# Patient Record
Sex: MALE | Race: WHITE | NOT HISPANIC OR LATINO
[De-identification: names, ages, dates, MRNs, and addresses within clinical notes are randomized per-mention and may not be internally consistent; named-entity substitution may affect disease eponyms.]

---

## 2018-08-31 ENCOUNTER — APPOINTMENT (OUTPATIENT)
Dept: PULMONOLOGY | Facility: CLINIC | Age: 28
End: 2018-08-31
Payer: COMMERCIAL

## 2018-08-31 VITALS
OXYGEN SATURATION: 100 % | HEIGHT: 74 IN | BODY MASS INDEX: 23.61 KG/M2 | SYSTOLIC BLOOD PRESSURE: 140 MMHG | HEART RATE: 79 BPM | WEIGHT: 184 LBS | DIASTOLIC BLOOD PRESSURE: 80 MMHG

## 2018-08-31 DIAGNOSIS — Z78.9 OTHER SPECIFIED HEALTH STATUS: ICD-10-CM

## 2018-08-31 DIAGNOSIS — Z57.9 OCCUPATIONAL EXPOSURE TO UNSPECIFIED RISK FACTOR: ICD-10-CM
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
## 2018-08-31 DIAGNOSIS — J38.00 PARALYSIS OF VOCAL CORDS AND LARYNX, UNSPECIFIED: ICD-10-CM

## 2018-08-31 DIAGNOSIS — R05 COUGH: ICD-10-CM

## 2018-08-31 DIAGNOSIS — Z72.820 SLEEP DEPRIVATION: ICD-10-CM

## 2018-08-31 PROCEDURE — 71046 X-RAY EXAM CHEST 2 VIEWS: CPT

## 2018-08-31 PROCEDURE — 94010 BREATHING CAPACITY TEST: CPT

## 2018-08-31 PROCEDURE — ZZZZZ: CPT

## 2018-08-31 PROCEDURE — 94727 GAS DIL/WSHOT DETER LNG VOL: CPT

## 2018-08-31 PROCEDURE — 99204 OFFICE O/P NEW MOD 45 MIN: CPT | Mod: 25

## 2018-08-31 PROCEDURE — 94729 DIFFUSING CAPACITY: CPT

## 2018-08-31 RX ORDER — RANITIDINE HYDROCHLORIDE 300 MG/1
300 CAPSULE ORAL
Qty: 90 | Refills: 1 | Status: ACTIVE | COMMUNITY
Start: 2018-08-31 | End: 1900-01-01

## 2018-08-31 SDOH — HEALTH STABILITY - PHYSICAL HEALTH: OCCUPATIONAL EXPOSURE TO UNSPECIFIED RISK FACTOR: Z57.9
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
## 2018-08-31 NOTE — PHYSICAL EXAM
[General Appearance - Well Developed] : well developed [Normal Appearance] : normal appearance [Well Groomed] : well groomed [General Appearance - Well Nourished] : well nourished [No Deformities] : no deformities [General Appearance - In No Acute Distress] : no acute distress [Normal Conjunctiva] : the conjunctiva exhibited no abnormalities [Eyelids - No Xanthelasma] : the eyelids demonstrated no xanthelasmas [Normal Oropharynx] : normal oropharynx [Neck Cervical Mass (___cm)] : no neck mass was observed [Jugular Venous Distention Increased] : there was no jugular-venous distention [Thyroid Diffuse Enlargement] : the thyroid was not enlarged [Thyroid Nodule] : there were no palpable thyroid nodules [Heart Rate And Rhythm] : heart rate and rhythm were normal [Heart Sounds] : normal S1 and S2 [Murmurs] : no murmurs present [Respiration, Rhythm And Depth] : normal respiratory rhythm and effort [Exaggerated Use Of Accessory Muscles For Inspiration] : no accessory muscle use [Auscultation Breath Sounds / Voice Sounds] : lungs were clear to auscultation bilaterally [Abdomen Soft] : soft [Abdomen Tenderness] : non-tender [Abdomen Mass (___ Cm)] : no abdominal mass palpated [Abnormal Walk] : normal gait [Gait - Sufficient For Exercise Testing] : the gait was sufficient for exercise testing [Nail Clubbing] : no clubbing of the fingernails [Cyanosis, Localized] : no localized cyanosis [Petechial Hemorrhages (___cm)] : no petechial hemorrhages [Skin Color & Pigmentation] : normal skin color and pigmentation [Skin Turgor] : normal skin turgor [] : no rash [Deep Tendon Reflexes (DTR)] : deep tendon reflexes were 2+ and symmetric [Sensation] : the sensory exam was normal to light touch and pinprick [No Focal Deficits] : no focal deficits [Oriented To Time, Place, And Person] : oriented to person, place, and time [Impaired Insight] : insight and judgment were intact [Affect] : the affect was normal [FreeTextEntry1] : right neck trauma (post surgical)
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
## 2018-08-31 NOTE — PROCEDURE
[FreeTextEntry1] : PFT- spi reveals normal flows; FEV1 was  4.96L which is 99% of predicted; normal lung volumes; normal diffusion at 46.4, which is 140% of predicted; normal flow volume loop

## 2018-10-26 ENCOUNTER — APPOINTMENT (OUTPATIENT)
Dept: PULMONOLOGY | Facility: CLINIC | Age: 28
End: 2018-10-26

## 2019-02-13 NOTE — ADDENDUM
[FreeTextEntry1] : Documented by Jeff Cox acting as a scribe for Dr. London Story on 8/31/18\par \par All medical record entries made by the Scribe were at my, Dr. London Story's, direction and personally dictated by me on 8/31/18. I have reviewed the chart and agree that the record accurately reflects my personal performance of the history, physical exam, assessment and plan. I have also personally directed, reviewed, and agree with the discharge instructions. \par \par \par \par \par   2

## 2019-08-28 ENCOUNTER — EMERGENCY (EMERGENCY)
Facility: HOSPITAL | Age: 29
LOS: 1 days | Discharge: HOME | End: 2019-08-28
Attending: EMERGENCY MEDICINE | Admitting: EMERGENCY MEDICINE
Payer: COMMERCIAL

## 2019-08-28 VITALS
SYSTOLIC BLOOD PRESSURE: 169 MMHG | TEMPERATURE: 99 F | WEIGHT: 195.11 LBS | RESPIRATION RATE: 17 BRPM | HEIGHT: 74 IN | OXYGEN SATURATION: 100 % | HEART RATE: 89 BPM | DIASTOLIC BLOOD PRESSURE: 45 MMHG

## 2019-08-28 VITALS
DIASTOLIC BLOOD PRESSURE: 84 MMHG | SYSTOLIC BLOOD PRESSURE: 125 MMHG | OXYGEN SATURATION: 99 % | TEMPERATURE: 99 F | RESPIRATION RATE: 18 BRPM | HEART RATE: 84 BPM

## 2019-08-28 PROCEDURE — 99283 EMERGENCY DEPT VISIT LOW MDM: CPT

## 2019-08-28 PROCEDURE — 73562 X-RAY EXAM OF KNEE 3: CPT | Mod: 26,RT

## 2019-08-28 PROCEDURE — 73562 X-RAY EXAM OF KNEE 3: CPT

## 2019-08-28 PROCEDURE — 99283 EMERGENCY DEPT VISIT LOW MDM: CPT | Mod: 25

## 2019-08-28 RX ORDER — IBUPROFEN 200 MG
600 TABLET ORAL ONCE
Refills: 0 | Status: COMPLETED | OUTPATIENT
Start: 2019-08-28 | End: 2019-08-28

## 2019-08-28 RX ORDER — IBUPROFEN 200 MG
1 TABLET ORAL
Qty: 20 | Refills: 0
Start: 2019-08-28 | End: 2019-09-01

## 2019-08-28 RX ADMIN — Medication 600 MILLIGRAM(S): at 18:13

## 2019-08-28 RX ADMIN — Medication 1 TABLET(S): at 18:13

## 2019-08-28 NOTE — ED PROVIDER NOTE - MUSCULOSKELETAL, MLM
Spine appears normal, range of motion is not limited, no muscle or joint tenderness, from, nvi of right knee , no swelling

## 2019-08-28 NOTE — ED ADULT NURSE NOTE - NSIMPLEMENTINTERV_GEN_ALL_ED
Implemented All Universal Safety Interventions:  [place] to call system. Call bell, personal items and telephone within reach. Instruct patient to call for assistance. Room bathroom lighting operational. Non-slip footwear when patient is off stretcher. Physically safe environment: no spills, clutter or unnecessary equipment. Stretcher in lowest position, wheels locked, appropriate side rails in place.

## 2019-08-28 NOTE — ED PROVIDER NOTE - PATIENT PORTAL LINK FT
You can access the FollowMyHealth Patient Portal offered by [facility] by registering at the following website: [email or web address]. By joining [employer]’s FollowMyHealth portal, you will also be able to view your health information using other applications (apps) compatible with our system.

## 2019-08-28 NOTE — ED PROVIDER NOTE - OBJECTIVE STATEMENT
28 y male presents with right pain, erythema x 4 days, denies trauma,  states he has had similar episodes in the past, last year was the left knee , previous has similar episode of with an elbow.  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  has been taking otc advil for pain.  states he felt chills , "cold like symptoms" on Sunday.  denies any other symptoms.  PMD Dr Motta, No ortho

## 2019-08-28 NOTE — ED PROVIDER NOTE - PROGRESS NOTE DETAILS
rx augmentin, motrin, sent to pharmacy, xray no acute findings,  advised return to ED tomorrow for knee check,  given information for Dr Patel, ortho, any concerns return to ED

## 2019-08-28 NOTE — ED ADULT NURSE NOTE - OBJECTIVE STATEMENT
27 y/o male comes in A&Ox4 from home with complaints of left knee swelling and pain. States he first noticed the symptoms on Monday but it has gotten worse. Denies any recent trauma, bug bite or wound. Denies fever, nausea or vomiting. States he had the chills yesterday. Plan of care discussed with patient. Comfort and safety maintained. Will continue to monitor.
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
## 2019-08-29 ENCOUNTER — INPATIENT (INPATIENT)
Facility: HOSPITAL | Age: 29
LOS: 2 days | Discharge: HOME | DRG: 603 | End: 2019-09-01
Attending: HOSPITALIST | Admitting: HOSPITALIST
Payer: COMMERCIAL

## 2019-08-29 VITALS
DIASTOLIC BLOOD PRESSURE: 74 MMHG | SYSTOLIC BLOOD PRESSURE: 110 MMHG | HEIGHT: 74 IN | RESPIRATION RATE: 16 BRPM | TEMPERATURE: 98 F | WEIGHT: 195.11 LBS | OXYGEN SATURATION: 97 % | HEART RATE: 92 BPM

## 2019-08-29 DIAGNOSIS — L03.90 CELLULITIS, UNSPECIFIED: ICD-10-CM

## 2019-08-29 DIAGNOSIS — Z86.79 PERSONAL HISTORY OF OTHER DISEASES OF THE CIRCULATORY SYSTEM: Chronic | ICD-10-CM

## 2019-08-29 DIAGNOSIS — Z29.9 ENCOUNTER FOR PROPHYLACTIC MEASURES, UNSPECIFIED: ICD-10-CM

## 2019-08-29 LAB
ALBUMIN SERPL ELPH-MCNC: 3.6 G/DL — SIGNIFICANT CHANGE UP (ref 3.3–5)
ALP SERPL-CCNC: 84 U/L — SIGNIFICANT CHANGE UP (ref 40–120)
ALT FLD-CCNC: 41 U/L — SIGNIFICANT CHANGE UP (ref 12–78)
ANION GAP SERPL CALC-SCNC: 9 MMOL/L — SIGNIFICANT CHANGE UP (ref 5–17)
AST SERPL-CCNC: 26 U/L — SIGNIFICANT CHANGE UP (ref 15–37)
BILIRUB SERPL-MCNC: 0.4 MG/DL — SIGNIFICANT CHANGE UP (ref 0.2–1.2)
BUN SERPL-MCNC: 23 MG/DL — SIGNIFICANT CHANGE UP (ref 7–23)
CALCIUM SERPL-MCNC: 9.2 MG/DL — SIGNIFICANT CHANGE UP (ref 8.5–10.1)
CHLORIDE SERPL-SCNC: 106 MMOL/L — SIGNIFICANT CHANGE UP (ref 96–108)
CO2 SERPL-SCNC: 28 MMOL/L — SIGNIFICANT CHANGE UP (ref 22–31)
CREAT SERPL-MCNC: 1 MG/DL — SIGNIFICANT CHANGE UP (ref 0.5–1.3)
GLUCOSE SERPL-MCNC: 82 MG/DL — SIGNIFICANT CHANGE UP (ref 70–99)
HCT VFR BLD CALC: 39.9 % — SIGNIFICANT CHANGE UP (ref 39–50)
HGB BLD-MCNC: 13.7 G/DL — SIGNIFICANT CHANGE UP (ref 13–17)
LACTATE SERPL-SCNC: 0.9 MMOL/L — SIGNIFICANT CHANGE UP (ref 0.7–2)
MCHC RBC-ENTMCNC: 30.5 PG — SIGNIFICANT CHANGE UP (ref 27–34)
MCHC RBC-ENTMCNC: 34.3 GM/DL — SIGNIFICANT CHANGE UP (ref 32–36)
MCV RBC AUTO: 88.9 FL — SIGNIFICANT CHANGE UP (ref 80–100)
NRBC # BLD: 0 /100 WBCS — SIGNIFICANT CHANGE UP (ref 0–0)
PLATELET # BLD AUTO: 199 K/UL — SIGNIFICANT CHANGE UP (ref 150–400)
POTASSIUM SERPL-MCNC: 3.5 MMOL/L — SIGNIFICANT CHANGE UP (ref 3.5–5.3)
POTASSIUM SERPL-SCNC: 3.5 MMOL/L — SIGNIFICANT CHANGE UP (ref 3.5–5.3)
PROT SERPL-MCNC: 7.6 G/DL — SIGNIFICANT CHANGE UP (ref 6–8.3)
RBC # BLD: 4.49 M/UL — SIGNIFICANT CHANGE UP (ref 4.2–5.8)
RBC # FLD: 11.7 % — SIGNIFICANT CHANGE UP (ref 10.3–14.5)
SODIUM SERPL-SCNC: 143 MMOL/L — SIGNIFICANT CHANGE UP (ref 135–145)
WBC # BLD: 7.95 K/UL — SIGNIFICANT CHANGE UP (ref 3.8–10.5)
WBC # FLD AUTO: 7.95 K/UL — SIGNIFICANT CHANGE UP (ref 3.8–10.5)

## 2019-08-29 PROCEDURE — 99223 1ST HOSP IP/OBS HIGH 75: CPT | Mod: GC

## 2019-08-29 PROCEDURE — 99281 EMR DPT VST MAYX REQ PHY/QHP: CPT

## 2019-08-29 RX ORDER — VANCOMYCIN HCL 1 G
1350 VIAL (EA) INTRAVENOUS EVERY 12 HOURS
Refills: 0 | Status: DISCONTINUED | OUTPATIENT
Start: 2019-08-29 | End: 2019-08-29

## 2019-08-29 RX ORDER — VANCOMYCIN HCL 1 G
1250 VIAL (EA) INTRAVENOUS EVERY 12 HOURS
Refills: 0 | Status: DISCONTINUED | OUTPATIENT
Start: 2019-08-30 | End: 2019-08-31

## 2019-08-29 RX ORDER — SODIUM CHLORIDE 9 MG/ML
1000 INJECTION INTRAMUSCULAR; INTRAVENOUS; SUBCUTANEOUS
Refills: 0 | Status: COMPLETED | OUTPATIENT
Start: 2019-08-29 | End: 2019-08-29

## 2019-08-29 RX ORDER — PIPERACILLIN AND TAZOBACTAM 4; .5 G/20ML; G/20ML
3.38 INJECTION, POWDER, LYOPHILIZED, FOR SOLUTION INTRAVENOUS ONCE
Refills: 0 | Status: COMPLETED | OUTPATIENT
Start: 2019-08-29 | End: 2019-08-29

## 2019-08-29 RX ORDER — ACETAMINOPHEN 500 MG
650 TABLET ORAL EVERY 6 HOURS
Refills: 0 | Status: DISCONTINUED | OUTPATIENT
Start: 2019-08-29 | End: 2019-08-30

## 2019-08-29 RX ORDER — VANCOMYCIN HCL 1 G
1000 VIAL (EA) INTRAVENOUS ONCE
Refills: 0 | Status: COMPLETED | OUTPATIENT
Start: 2019-08-29 | End: 2019-08-29

## 2019-08-29 RX ADMIN — Medication 250 MILLIGRAM(S): at 20:52

## 2019-08-29 RX ADMIN — SODIUM CHLORIDE 2000 MILLILITER(S): 9 INJECTION INTRAMUSCULAR; INTRAVENOUS; SUBCUTANEOUS at 20:34

## 2019-08-29 RX ADMIN — Medication 1000 MILLIGRAM(S): at 22:10

## 2019-08-29 RX ADMIN — PIPERACILLIN AND TAZOBACTAM 200 GRAM(S): 4; .5 INJECTION, POWDER, LYOPHILIZED, FOR SOLUTION INTRAVENOUS at 22:23

## 2019-08-29 RX ADMIN — SODIUM CHLORIDE 2000 MILLILITER(S): 9 INJECTION INTRAMUSCULAR; INTRAVENOUS; SUBCUTANEOUS at 22:23

## 2019-08-29 NOTE — ED PROVIDER NOTE - OBJECTIVE STATEMENT
Pt is a 27 yo male hx one episode of cellulitis  in past requiring hospitalization.  pt seen in ed yesterday and dx with cellulitis and redness marked and pt given augmentin. pt today with significant increase in redness and pain, no f/c, no skin wound, abscess. pt thinks may have started with bug bite.  knee joint with from. no trauma    pmd: augie

## 2019-08-29 NOTE — CONSULT NOTE ADULT - ASSESSMENT
A/P 28y Male with R knee pain 2/2 cellulitis and pre-patellar bursitis    -Patient's knee pain likely due to superficial infection/cellulitis and pre-patellar bursitis. Patient has normal WBC, is afebrile, is able to tolerate ROM and ambulate without any difficulty so suspicion for deep infection is low. Patient has also had recurrent episodes of cellulitis in the past as well. He has continued work and may have exacerbated bursitis  -Cellulitis marked by ED during initial visit 8/28, has expanded through margins  -WBAT RLE  -Recommend antibiotics as per medicine  -Rest ice elevate  -Pain control  -FU labs  -No acute orthopaedic surgical intervention at this time  -Will discuss with attending Dr. Joel and will advise of any changes  -Ortho to follow
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
## 2019-08-29 NOTE — H&P ADULT - NSHPPHYSICALEXAM_GEN_ALL_CORE
T(C): 36.9 (08-29-19 @ 19:40), Max: 36.9 (08-29-19 @ 19:40)  HR: 92 (08-29-19 @ 19:40) (92 - 92)  BP: 110/74 (08-29-19 @ 19:40) (110/74 - 110/74)  RR: 16 (08-29-19 @ 19:40) (16 - 16)  SpO2: 97% (08-29-19 @ 19:40) (97% - 97%)    GENERAL: patient appears well, no acute distress  EYES: sclera clear, no exudates, EOMI  ENMT: oropharynx clear without erythema, no exudates, moist mucous membranes  NECK: supple, soft  LUNGS: clear to auscultation, no wheezing or rhonchi appreciated  HEART: S1/S2, regular rate and rhythm, no murmurs noted, + trace right lower extremity edema  GASTROINTESTINAL: abdomen is soft, nontender, nondistended, normoactive bowel sounds  INTEGUMENT: good skin turgor, excoriated insect bites on right shin, erythema from right thigh to mid right shin, trace generalized swelling  right knee, TTP at tibial plateau , FROM knee joint without,   MUSCULOSKELETAL: no clubbing or cyanosis, no obvious deformity  NEUROLOGIC: awake, alert, oriented x3, strength 5/5 UE and LE bl , no sensory deficits  PSYCHIATRIC: mood is good, affect is congruent, linear and logical thought process  HEME/LYMPH: no obvious ecchymosis or petechiae T(C): 36.9 (08-29-19 @ 19:40), Max: 36.9 (08-29-19 @ 19:40)  HR: 92 (08-29-19 @ 19:40) (92 - 92)  BP: 110/74 (08-29-19 @ 19:40) (110/74 - 110/74)  RR: 16 (08-29-19 @ 19:40) (16 - 16)  SpO2: 97% (08-29-19 @ 19:40) (97% - 97%)    GENERAL: patient appears well, no acute distress  EYES: sclera clear, no exudates, EOMI  ENMT: oropharynx clear without erythema, no exudates, moist mucous membranes  NECK: supple, soft  LUNGS: clear to auscultation, no wheezing or rhonchi appreciated  HEART: S1/S2, regular rate and rhythm, no murmurs noted, + trace right lower extremity edema  GASTROINTESTINAL: abdomen is soft, nontender, nondistended, normoactive bowel sounds  INTEGUMENT: good skin turgor, excoriated insect bites on right shin, erythema from right thigh to mid right shin, trace generalized swelling  right knee, TTP at tibial plateau , FROM knee joint without pain,   MUSCULOSKELETAL: no clubbing or cyanosis, no obvious deformity  NEUROLOGIC: awake, alert, oriented x3, strength 5/5 UE and LE bl , no sensory deficits  PSYCHIATRIC: mood is good, affect is congruent, linear and logical thought process  HEME/LYMPH: no obvious ecchymosis or petechiae

## 2019-08-29 NOTE — H&P ADULT - PROBLEM SELECTOR PLAN 1
- Cellulitis failed PO outpatient ab, continued spread of rash and swelling  - continue with  Vancomycin 1350 q12 hours  - CBC, ESR, CRP in Am  - Ortho consult routine - Cellulitis failed PO outpatient ab, continued spread of erythema and swelling, f/u cultures  - continue with  Vancomycin 1350 q12 hours  - CBC, ESR, CRP in Am  - Ortho consult routine

## 2019-08-29 NOTE — ED ADULT TRIAGE NOTE - CHIEF COMPLAINT QUOTE
was seen and treated here yesterday for right knee cellulitis.  had and xray and sent home with PO Motrin and Augmentin.  returns today for a wound check.  persistent erythema, edema and pain denies fever/chills

## 2019-08-29 NOTE — ED PROVIDER NOTE - CLINICAL SUMMARY MEDICAL DECISION MAKING FREE TEXT BOX
worsening cellulitis after augmentin x 24 hours, spreading and increased pain, no f/c, vss, no crepitus, labs, cultures, iv abx, admit
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
## 2019-08-29 NOTE — ED PROVIDER NOTE - MUSCULOSKELETAL, MLM
Spine appears normal, range of motion is not limited, no muscle or joint tenderness, right knee from without pain, no bursal swelling or ttp, no effusion
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
## 2019-08-29 NOTE — ED ADULT NURSE NOTE - OBJECTIVE STATEMENT
patient a/o x 4 with a calm affect c/o increasing redness, pain and swelling at right knee beyond the marked area on the knee from yesterdays visit to the ED.  patient pending initial labs and IV antibiotics.

## 2019-08-29 NOTE — H&P ADULT - PROBLEM SELECTOR PLAN 2
IMPROVE VTE Individual Risk Assessment  RISK                                                                Points  [  ] Previous VTE                                                  3    [  ] Thrombophilia                                               2  [  ] Lower limb paralysis                                      2        (unable to hold up >15 seconds)    [  ] Current Cancer                                              2         (within 6 months)    [  ] Immobilization > 24 hrs                                1  [  ] ICU/CCU stay > 24 hours                              1  [  ] Age > 60                                                      1  IMPROVE VTE Score _0________  SCds while in bed, ambulate  IMPROVE Score 0-1: Low Risk, No VTE prophylaxis required for most patients, encourage ambulation.   IMPROVE Score 2-3: At risk, pharmacologic VTE prophylaxis is indicated for most patients (in the absence of a contraindication)  IMPROVE Score > or = 4: High Risk, pharmacologic VTE prophylaxis is indicated for most patients (in the absence of a contraindication)
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
## 2019-08-29 NOTE — ED PROVIDER NOTE - SKIN, MLM
Skin normal color for race, warm, dry and intact. right knee with bright erythema clearly outlined with marker from yesterday with redness now expanded well outside of margins, FCI down leg anteriorly and to mid thigh, + warmth

## 2019-08-30 ENCOUNTER — TRANSCRIPTION ENCOUNTER (OUTPATIENT)
Age: 29
End: 2019-08-30

## 2019-08-30 LAB
ANION GAP SERPL CALC-SCNC: 8 MMOL/L — SIGNIFICANT CHANGE UP (ref 5–17)
BUN SERPL-MCNC: 16 MG/DL — SIGNIFICANT CHANGE UP (ref 7–23)
CALCIUM SERPL-MCNC: 9.1 MG/DL — SIGNIFICANT CHANGE UP (ref 8.5–10.1)
CHLORIDE SERPL-SCNC: 108 MMOL/L — SIGNIFICANT CHANGE UP (ref 96–108)
CO2 SERPL-SCNC: 27 MMOL/L — SIGNIFICANT CHANGE UP (ref 22–31)
CREAT SERPL-MCNC: 0.78 MG/DL — SIGNIFICANT CHANGE UP (ref 0.5–1.3)
CRP SERPL-MCNC: 7.77 MG/DL — HIGH (ref 0–0.4)
ERYTHROCYTE [SEDIMENTATION RATE] IN BLOOD: 18 MM/HR — HIGH (ref 0–15)
GLUCOSE SERPL-MCNC: 96 MG/DL — SIGNIFICANT CHANGE UP (ref 70–99)
HCT VFR BLD CALC: 41.6 % — SIGNIFICANT CHANGE UP (ref 39–50)
HGB BLD-MCNC: 14.1 G/DL — SIGNIFICANT CHANGE UP (ref 13–17)
MCHC RBC-ENTMCNC: 30.1 PG — SIGNIFICANT CHANGE UP (ref 27–34)
MCHC RBC-ENTMCNC: 33.9 GM/DL — SIGNIFICANT CHANGE UP (ref 32–36)
MCV RBC AUTO: 88.9 FL — SIGNIFICANT CHANGE UP (ref 80–100)
NRBC # BLD: 0 /100 WBCS — SIGNIFICANT CHANGE UP (ref 0–0)
PLATELET # BLD AUTO: 209 K/UL — SIGNIFICANT CHANGE UP (ref 150–400)
POTASSIUM SERPL-MCNC: 3.9 MMOL/L — SIGNIFICANT CHANGE UP (ref 3.5–5.3)
POTASSIUM SERPL-SCNC: 3.9 MMOL/L — SIGNIFICANT CHANGE UP (ref 3.5–5.3)
PROCALCITONIN SERPL-MCNC: <0.05 — SIGNIFICANT CHANGE UP (ref 0–0.04)
RBC # BLD: 4.68 M/UL — SIGNIFICANT CHANGE UP (ref 4.2–5.8)
RBC # FLD: 11.4 % — SIGNIFICANT CHANGE UP (ref 10.3–14.5)
SODIUM SERPL-SCNC: 143 MMOL/L — SIGNIFICANT CHANGE UP (ref 135–145)
WBC # BLD: 6.74 K/UL — SIGNIFICANT CHANGE UP (ref 3.8–10.5)
WBC # FLD AUTO: 6.74 K/UL — SIGNIFICANT CHANGE UP (ref 3.8–10.5)

## 2019-08-30 PROCEDURE — 99232 SBSQ HOSP IP/OBS MODERATE 35: CPT | Mod: GC

## 2019-08-30 PROCEDURE — 73562 X-RAY EXAM OF KNEE 3: CPT | Mod: 26,RT

## 2019-08-30 RX ORDER — ACETAMINOPHEN 500 MG
650 TABLET ORAL EVERY 6 HOURS
Refills: 0 | Status: DISCONTINUED | OUTPATIENT
Start: 2019-08-30 | End: 2019-09-01

## 2019-08-30 RX ADMIN — Medication 650 MILLIGRAM(S): at 18:09

## 2019-08-30 RX ADMIN — Medication 166.67 MILLIGRAM(S): at 10:55

## 2019-08-30 RX ADMIN — Medication 166.67 MILLIGRAM(S): at 21:25

## 2019-08-30 RX ADMIN — Medication 650 MILLIGRAM(S): at 16:53

## 2019-08-30 RX ADMIN — Medication 650 MILLIGRAM(S): at 08:27

## 2019-08-30 RX ADMIN — Medication 650 MILLIGRAM(S): at 09:30

## 2019-08-30 NOTE — PROGRESS NOTE ADULT - ASSESSMENT
A/P 28M with R knee pain due to cellulitis and pre-patellar bursitis    -Margins re-marked on 8/30  -Erythema/pain improved compared to initial consult on 8/29  -WBAT RLE  -Recommend antibiotics as per medicine  -Rest ice elevate  -Pain control  -FU labs  -No acute orthopaedic surgical intervention at this time  -Will discuss with attending Dr. Joel and will advise of any changes  -Ortho to follow    -No plan for aspiration A/P 28M with R knee pain due to cellulitis and pre-patellar bursitis    -Margins re-marked on 8/30  -Erythema/pain improved compared to initial consult on 8/29  -WBAT RLE  -Recommend antibiotics as per medicine  -No plan for aspiration  -Rest ice elevate  -Pain control  -FU labs  -No acute orthopaedic surgical intervention at this time, please re-consult as needed  -Follow up as needed with Dr. Joel in the office  -Ortho stable for DC

## 2019-08-30 NOTE — DISCHARGE NOTE PROVIDER - NSDCCPCAREPLAN_GEN_ALL_CORE_FT
PRINCIPAL DISCHARGE DIAGNOSIS  Diagnosis: Cellulitis  Assessment and Plan of Treatment: You were initially treated with IV Antibiotics and transitioned to oral _____. Please follow up with your PCP within 1 week of discharge. You were seen by orthopedic surgeon while in the hospital. You can follow up with Dr. Joel as outpatient within 1-2 weeks of discharge. PRINCIPAL DISCHARGE DIAGNOSIS  Diagnosis: Cellulitis  Assessment and Plan of Treatment: You were initially treated with IV Antibiotics and transitioned to oral Cefadroxil. Please follow up with your PCP within 1 week of discharge. You were seen by orthopedic surgeon while in the hospital. You can follow up with Dr. Joel as outpatient within 1-2 weeks of discharge.

## 2019-08-30 NOTE — PROGRESS NOTE ADULT - PROBLEM SELECTOR PLAN 1
- RLE Cellulitis failed PO outpatient abx (amoxi clauv), continued spread of erythema and swelling  - f/u blood cultures  - continue with  Vancomycin 1250 q12 hours with vanc trough   - ESR mildly elevated 18. No leukocytosis. Afebrile. Cellulitis appears to be clinically improving.   - Ortho consult appreciated. He will follow up with Dr. Joel upon dc.   - WBAT RLE, PO protocol. Tylenol prn mild pain
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
## 2019-08-30 NOTE — DISCHARGE NOTE PROVIDER - PROVIDER TOKENS
PROVIDER:[TOKEN:[[id number]],FOLLOWUP:[1 week]],PROVIDER:[TOKEN:[[id number]],FOLLOWUP:[1 week]]

## 2019-08-30 NOTE — PROGRESS NOTE ADULT - ATTENDING COMMENTS
Pt. seen and evaluated for cellulitis of RLE.  Pt. is in no distress.  Feeling better.  Tolerating IV antibiotic.  Receding erythema and edema of the RLE.  Physical examination as above.     Plan:  -Cellulitis and pre-patellar bursitis:  Continue Vancomycin 1250mg IV q12h.  Tylenol PRN.  Check blood cx.  Appreciated orthopedic input.  ID consult.   -VTE ppx: SCD

## 2019-08-30 NOTE — DISCHARGE NOTE PROVIDER - CARE PROVIDER_API CALL
Estuardo Motta)  Medicine  [address] [place] [address]  Phone: [phone]  Fax: [phone]  Follow Up Time: 1 week    Patrick Joel)  Orthopaedic Surgery  [address] [place] [address]  Phone: [phone]  Fax: [phone]  Follow Up Time: 1 week

## 2019-08-30 NOTE — PROGRESS NOTE ADULT - SUBJECTIVE AND OBJECTIVE BOX
Patient s/e at bedside, resting comfortably. He states that his rash and pain have much improved compared to last night. No fever or chills currently.    Vital Signs Last 24 Hrs  T(C): 36.7 (30 Aug 2019 05:30), Max: 36.9 (29 Aug 2019 19:40)  T(F): 98 (30 Aug 2019 05:30), Max: 98.5 (29 Aug 2019 19:40)  HR: 92 (30 Aug 2019 05:30) (78 - 97)  BP: 105/58 (30 Aug 2019 05:30) (105/58 - 130/62)  BP(mean): --  RR: 18 (30 Aug 2019 05:30) (15 - 18)  SpO2: 96% (30 Aug 2019 05:30) (96% - 98%)    PE:   Gen: NAD, resting comfortably  RLE:   Erythema around knee diminishing and receded from marked margins  TTP over right knee  Full AROM/PROM of knee 0-150 without pain, no pain on micromotion  +TA/EHL/FHL/GS  SILT L2-S1  DP/PT 2+  Compartments soft and compressible                          13.7   7.95  )-----------( 199      ( 29 Aug 2019 20:44 )             39.9   08-29    143  |  106  |  23  ----------------------------<  82  3.5   |  28  |  1.00    Ca    9.2      29 Aug 2019 20:44    TPro  7.6  /  Alb  3.6  /  TBili  0.4  /  DBili  x   /  AST  26  /  ALT  41  /  AlkPhos  84  08-29

## 2019-08-30 NOTE — CHART NOTE - NSCHARTNOTEFT_GEN_A_CORE
Patient refusing X rays at this time. No history of recent trauma, will discuss with patient again in morning. Patient is aware of risks of not obtaining repeat X rays
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
## 2019-08-31 LAB
ANION GAP SERPL CALC-SCNC: 8 MMOL/L — SIGNIFICANT CHANGE UP (ref 5–17)
BUN SERPL-MCNC: 14 MG/DL — SIGNIFICANT CHANGE UP (ref 7–23)
CALCIUM SERPL-MCNC: 9.1 MG/DL — SIGNIFICANT CHANGE UP (ref 8.5–10.1)
CHLORIDE SERPL-SCNC: 107 MMOL/L — SIGNIFICANT CHANGE UP (ref 96–108)
CO2 SERPL-SCNC: 28 MMOL/L — SIGNIFICANT CHANGE UP (ref 22–31)
CREAT SERPL-MCNC: 0.87 MG/DL — SIGNIFICANT CHANGE UP (ref 0.5–1.3)
GLUCOSE SERPL-MCNC: 91 MG/DL — SIGNIFICANT CHANGE UP (ref 70–99)
HCT VFR BLD CALC: 43.4 % — SIGNIFICANT CHANGE UP (ref 39–50)
HGB BLD-MCNC: 14.6 G/DL — SIGNIFICANT CHANGE UP (ref 13–17)
MCHC RBC-ENTMCNC: 29.9 PG — SIGNIFICANT CHANGE UP (ref 27–34)
MCHC RBC-ENTMCNC: 33.6 GM/DL — SIGNIFICANT CHANGE UP (ref 32–36)
MCV RBC AUTO: 88.8 FL — SIGNIFICANT CHANGE UP (ref 80–100)
MRSA PCR RESULT.: SIGNIFICANT CHANGE UP
NRBC # BLD: 0 /100 WBCS — SIGNIFICANT CHANGE UP (ref 0–0)
PLATELET # BLD AUTO: 249 K/UL — SIGNIFICANT CHANGE UP (ref 150–400)
POTASSIUM SERPL-MCNC: 3.8 MMOL/L — SIGNIFICANT CHANGE UP (ref 3.5–5.3)
POTASSIUM SERPL-SCNC: 3.8 MMOL/L — SIGNIFICANT CHANGE UP (ref 3.5–5.3)
RBC # BLD: 4.89 M/UL — SIGNIFICANT CHANGE UP (ref 4.2–5.8)
RBC # FLD: 11.3 % — SIGNIFICANT CHANGE UP (ref 10.3–14.5)
S AUREUS DNA NOSE QL NAA+PROBE: DETECTED
SODIUM SERPL-SCNC: 143 MMOL/L — SIGNIFICANT CHANGE UP (ref 135–145)
WBC # BLD: 5.7 K/UL — SIGNIFICANT CHANGE UP (ref 3.8–10.5)
WBC # FLD AUTO: 5.7 K/UL — SIGNIFICANT CHANGE UP (ref 3.8–10.5)

## 2019-08-31 PROCEDURE — 99232 SBSQ HOSP IP/OBS MODERATE 35: CPT

## 2019-08-31 RX ORDER — CEFAZOLIN SODIUM 1 G
1000 VIAL (EA) INJECTION EVERY 8 HOURS
Refills: 0 | Status: DISCONTINUED | OUTPATIENT
Start: 2019-08-31 | End: 2019-09-01

## 2019-08-31 RX ADMIN — Medication 100 MILLIGRAM(S): at 14:02

## 2019-08-31 RX ADMIN — Medication 166.67 MILLIGRAM(S): at 09:02

## 2019-08-31 RX ADMIN — Medication 100 MILLIGRAM(S): at 21:05

## 2019-08-31 NOTE — PROGRESS NOTE ADULT - SUBJECTIVE AND OBJECTIVE BOX
Patient s/e at bedside, resting comfortably. No complaints. Denies fever or chills, Denies numbness or tingling of affected extremity.    Vital Signs Last 24 Hrs  T(C): 36.4 (31 Aug 2019 05:17), Max: 37.3 (30 Aug 2019 13:04)  T(F): 97.5 (31 Aug 2019 05:17), Max: 99.2 (30 Aug 2019 13:04)  HR: 78 (31 Aug 2019 05:17) (74 - 82)  BP: 101/67 (31 Aug 2019 05:17) (101/67 - 124/73)  BP(mean): --  RR: 16 (31 Aug 2019 05:17) (16 - 18)  SpO2: 98% (31 Aug 2019 05:17) (97% - 98%)    PE  Gen: NAD, resting comfortably  RLE:   Skin intact, erythema markedly receded from previously marked margins on 8/28 and 8/30  Mildly TTP at inferior pole of patella, no palpable fluctuance  Full AROM/PROM 0-120 without pain  +TA/EHL/FHL/GS  SILT L2-S1  DP/PT 2+  Compartments soft and compressible                          14.6   5.70  )-----------( 249      ( 31 Aug 2019 09:05 )             43.4     08-31    143  |  107  |  14  ----------------------------<  91  3.8   |  28  |  0.87    Ca    9.1      31 Aug 2019 09:05    TPro  7.6  /  Alb  3.6  /  TBili  0.4  /  DBili  x   /  AST  26  /  ALT  41  /  AlkPhos  84  08-29

## 2019-08-31 NOTE — PROGRESS NOTE ADULT - SUBJECTIVE AND OBJECTIVE BOX
ID progress note     Name: YAS PEREYRA  Age: 28y  Gender: Male  MRN: [id number]    Interval History-- Events noted, doing well, improved swelling and redness of right leg and knee, localised swelling over patella   Notes reviewed    Past Medical History--  Stenosis of carotid artery  Cellulitis of knee, left  Cellulitis of left hand  No pertinent past medical history  History of dissection of external carotid artery  No significant past surgical history      For details regarding the patient's social history, family history, and other miscellaneous elements, please refer the initial infectious diseases consultation and/or the admitting history and physical examination for this admission.    Allergies--  Allergies    No Known Allergies    Intolerances        Medications--  Antibiotics:  vancomycin  IVPB 1250 milliGRAM(s) IV Intermittent every 12 hours    Immunologic:    Other:  acetaminophen   Tablet .. PRN      Review of Systems--  A 10-point review of systems was obtained.     Pertinent positives and negatives--  Constitutional: No fevers. No Chills. No Rigors.   Cardiovascular: No chest pain. No palpitations.  Respiratory: No shortness of breath. No cough.  Gastrointestinal: No nausea or vomiting. No diarrhea or constipation.   Psychiatric: Pleasant. Appropriate affect.    Review of systems otherwise negative except as previously noted.    Physical Examination--  Vital Signs: T(F): 97.5 (08-31-19 @ 05:17), Max: 99.2 (08-30-19 @ 13:04)  HR: 78 (08-31-19 @ 05:17)  BP: 101/67 (08-31-19 @ 05:17)  RR: 16 (08-31-19 @ 05:17)  SpO2: 98% (08-31-19 @ 05:17)  Wt(kg): --  General: Nontoxic-appearing Male in no acute distress.  HEENT: AT/NC. PERRL. EOMI. Anicteric. Conjunctiva pink and moist. Oropharynx clear. Dentition fair.  Neck: Not rigid. No sense of mass.  Nodes: None palpable.  Lungs: Clear bilaterally without rales, wheezing or rhonchi  Heart: Regular rate and rhythm. No Murmur. No rub. No gallop. No palpable thrill.  Abdomen: Bowel sounds present and normoactive. Soft. Nondistended. Nontender. No sense of mass. No organomegaly.  Back: No spinal tenderness. No costovertebral angle tenderness.   Extremities: No cyanosis or clubbing. RLE- Erythema around knee diminishing and receded from marked margins  Skin: Warm. Dry. Good turgor. No rash. No vasculitic stigmata.  Psychiatric: Appropriate affect and mood for situation.         Laboratory Studies--  CBC                        14.6   5.70  )-----------( 249      ( 31 Aug 2019 09:05 )             43.4       Chemistries  08-31    143  |  107  |  14  ----------------------------<  91  3.8   |  28  |  0.87    Ca    9.1      31 Aug 2019 09:05    TPro  7.6  /  Alb  3.6  /  TBili  0.4  /  DBili  x   /  AST  26  /  ALT  41  /  AlkPhos  84  08-29    Sedimentation Rate, Erythrocyte (08.30.19 @ 00:34)    Sedimentation Rate, Erythrocyte: 18 mm/hr    Procalcitonin, Serum (08.30.19 @ 15:55)    Procalcitonin, Serum: <0.05:     C-Reactive Protein, Serum (08.30.19 @ 09:01)    C-Reactive Protein, Serum: 7.77 mg/dL        Culture Data    Culture - Blood (collected 30 Aug 2019 00:35)  Source: .Blood Blood-Venous  Preliminary Report (31 Aug 2019 01:01):    No growth to date.    Culture - Blood (collected 30 Aug 2019 00:35)  Source: .Blood Blood-Venous  Preliminary Report (31 Aug 2019 01:01):    No growth to date.    RADIOLOGY:     Xray Knee 3 Views, Right (08.30.19 @ 07:50) >    Findings/  impression:    No acute fracture or dislocation. Marked soft tissue swelling overlying   the anterior knee.      Assessment :     28 year old male with above medical hx admitted with cellulitis right knee with likely prepatellar bursitiis . Did not respond to Augmentin raising the possibility of MRSA . Clinically appears stable .  May be developing localised collection over patella   Plan :   - continue with IV Vancomycin pending cs   - follow Screen for MRSA , if negative change to Ancef   - warm compresses to right knee   - orthopedic follow up , may need aspiration if does not improve   - elevate right leg   - possible dc in am if stable     Continue with present regime .  Appropriate use of antibiotics and adverse effects reviewed.    I have discussed the above plan of care with patient in detail. He expressed understanding of the treatment plan . Risks, benefits and alternatives discussed in detail. I have asked if he has  any questions or concerns and appropriately addressed them to the best of my ability .      > 35 minutes spent in direct patient care reviewing  the notes, lab data/ imaging , discussion with multidisciplinary team. All questions were addressed and answered to the best of my capacity .    Thank you for allowing me to participate in the care of your patient .        Atul Hopkins MD  [phone] ID progress note     Name: YAS PEREYRA  Age: 28y  Gender: Male  MRN: [id number]    Interval History-- Events noted, doing well, improved swelling and redness of right leg and knee, localised swelling over patella   Notes reviewed    Past Medical History--  Stenosis of carotid artery  Cellulitis of knee, left  Cellulitis of left hand  No pertinent past medical history  History of dissection of external carotid artery  No significant past surgical history      For details regarding the patient's social history, family history, and other miscellaneous elements, please refer the initial infectious diseases consultation and/or the admitting history and physical examination for this admission.    Allergies--  Allergies    No Known Allergies    Intolerances        Medications--  Antibiotics:  vancomycin  IVPB 1250 milliGRAM(s) IV Intermittent every 12 hours    Immunologic:    Other:  acetaminophen   Tablet .. PRN      Review of Systems--  A 10-point review of systems was obtained.     Pertinent positives and negatives--  Constitutional: No fevers. No Chills. No Rigors.   Cardiovascular: No chest pain. No palpitations.  Respiratory: No shortness of breath. No cough.  Gastrointestinal: No nausea or vomiting. No diarrhea or constipation.   Psychiatric: Pleasant. Appropriate affect.    Review of systems otherwise negative except as previously noted.    Physical Examination--  Vital Signs: T(F): 97.5 (08-31-19 @ 05:17), Max: 99.2 (08-30-19 @ 13:04)  HR: 78 (08-31-19 @ 05:17)  BP: 101/67 (08-31-19 @ 05:17)  RR: 16 (08-31-19 @ 05:17)  SpO2: 98% (08-31-19 @ 05:17)  Wt(kg): --  General: Nontoxic-appearing Male in no acute distress.  HEENT: AT/NC. PERRL. EOMI. Anicteric. Conjunctiva pink and moist. Oropharynx clear. Dentition fair.  Neck: Not rigid. No sense of mass.  Nodes: None palpable.  Lungs: Clear bilaterally without rales, wheezing or rhonchi  Heart: Regular rate and rhythm. No Murmur. No rub. No gallop. No palpable thrill.  Abdomen: Bowel sounds present and normoactive. Soft. Nondistended. Nontender. No sense of mass. No organomegaly.  Back: No spinal tenderness. No costovertebral angle tenderness.   Extremities: No cyanosis or clubbing. RLE- Erythema around knee diminishing and receded from marked margins  Skin: Warm. Dry. Good turgor. No rash. No vasculitic stigmata.  Psychiatric: Appropriate affect and mood for situation.         Laboratory Studies--  CBC                        14.6   5.70  )-----------( 249      ( 31 Aug 2019 09:05 )             43.4       Chemistries  08-31    143  |  107  |  14  ----------------------------<  91  3.8   |  28  |  0.87    Ca    9.1      31 Aug 2019 09:05    TPro  7.6  /  Alb  3.6  /  TBili  0.4  /  DBili  x   /  AST  26  /  ALT  41  /  AlkPhos  84  08-29    Sedimentation Rate, Erythrocyte (08.30.19 @ 00:34)    Sedimentation Rate, Erythrocyte: 18 mm/hr    Procalcitonin, Serum (08.30.19 @ 15:55)    Procalcitonin, Serum: <0.05:     C-Reactive Protein, Serum (08.30.19 @ 09:01)    C-Reactive Protein, Serum: 7.77 mg/dL        Culture Data    MRSA/MSSA PCR (08.30.19 @ 22:18)    MRSA PCR Result.: NotDetec:    Staph Aureus PCR Result: Detected        Culture - Blood (collected 30 Aug 2019 00:35)  Source: .Blood Blood-Venous  Preliminary Report (31 Aug 2019 01:01):    No growth to date.    Culture - Blood (collected 30 Aug 2019 00:35)  Source: .Blood Blood-Venous  Preliminary Report (31 Aug 2019 01:01):    No growth to date.    RADIOLOGY:     Xray Knee 3 Views, Right (08.30.19 @ 07:50) >    Findings/  impression:    No acute fracture or dislocation. Marked soft tissue swelling overlying   the anterior knee.      Assessment :     28 year old male with above medical hx admitted with cellulitis right knee with likely prepatellar bursitiis . Did not respond to Augmentin raising the possibility of MRSA . Clinically appears stable .  May be developing localised collection over patella   Plan :   - as  Screen for MRSA , is negative , will change to Ancef 1 gram q 8   - warm compresses to right knee   - orthopedic follow up , may need aspiration if does not improve   - elevate right leg   - possible dc in am if stable     Continue with present regime .  Appropriate use of antibiotics and adverse effects reviewed.    I have discussed the above plan of care with patient in detail. He expressed understanding of the treatment plan . Risks, benefits and alternatives discussed in detail. I have asked if he has  any questions or concerns and appropriately addressed them to the best of my ability .      > 35 minutes spent in direct patient care reviewing  the notes, lab data/ imaging , discussion with multidisciplinary team. All questions were addressed and answered to the best of my capacity .    Thank you for allowing me to participate in the care of your patient .        Atul Hopkins MD  [phone]

## 2019-08-31 NOTE — PROGRESS NOTE ADULT - ASSESSMENT
A/P 28M with R knee cellulitis    -Erythema improved, no pain on ROM, no clinical suspicion for septic arthritis   -No areas of fluctuance requiring aspiration at this time  -Clinically improved  -No plan for acute orthopaedic surgical intervention  -Case discussed with ID Dr. Hopkins, plan for conversion to PO abx and DC home tomorrow if continues to improve  -Agree with above plan for DC tomorrow on PO abx, will discuss with attending and advise if plan changes

## 2019-08-31 NOTE — PROGRESS NOTE ADULT - SUBJECTIVE AND OBJECTIVE BOX
CHIEF COMPLAINT/INTERVAL HISTORY:  Pt. seen and evaluated for cellulitis of the RLE.  Pt. is in no distress.  Tolerating IV antibiotic with improvement of skin infection.      REVIEW OF SYSTEMS:  No fever, CP, SOB, or abdominal pain    Vital Signs Last 24 Hrs  T(C): 36.4 (31 Aug 2019 05:17), Max: 37.3 (30 Aug 2019 13:04)  T(F): 97.5 (31 Aug 2019 05:17), Max: 99.2 (30 Aug 2019 13:04)  HR: 78 (31 Aug 2019 05:17) (74 - 82)  BP: 101/67 (31 Aug 2019 05:17) (101/67 - 124/73)  BP(mean): --  RR: 16 (31 Aug 2019 05:17) (16 - 18)  SpO2: 98% (31 Aug 2019 05:17) (97% - 98%)    PHYSICAL EXAM:  GENERAL: NAD  HEENT: EOMI, hearing normal, conjunctiva and sclera clear  Chest: CTA bilaterally, no wheezing  CV: S1S2, RRR,   GI: soft, +BS, NT/ND  Musculoskeletal: decreasing erythema and edema of R. knee  Psychiatric: affect nL, mood nL  Skin: warm and dry, receding erythema around R. knee    LABS:                        14.6   5.70  )-----------( 249      ( 31 Aug 2019 09:05 )             43.4     08-31    143  |  107  |  14  ----------------------------<  91  3.8   |  28  |  0.87    Ca    9.1      31 Aug 2019 09:05    TPro  7.6  /  Alb  3.6  /  TBili  0.4  /  DBili  x   /  AST  26  /  ALT  41  /  AlkPhos  84  08-29      Assessment and Plan:  -Cellulitis and pre-patellar bursitis:  Blood cx NGTD.  Continue Vancomycin 1250mg IV q12h.  Tylenol PRN.  Appreciated orthopedic input.  ID f/u  -VTE ppx: SCD

## 2019-09-01 ENCOUNTER — TRANSCRIPTION ENCOUNTER (OUTPATIENT)
Age: 29
End: 2019-09-01

## 2019-09-01 VITALS
OXYGEN SATURATION: 98 % | SYSTOLIC BLOOD PRESSURE: 107 MMHG | HEART RATE: 82 BPM | RESPIRATION RATE: 17 BRPM | DIASTOLIC BLOOD PRESSURE: 68 MMHG | TEMPERATURE: 98 F

## 2019-09-01 PROCEDURE — 80053 COMPREHEN METABOLIC PANEL: CPT

## 2019-09-01 PROCEDURE — 84145 PROCALCITONIN (PCT): CPT

## 2019-09-01 PROCEDURE — 87640 STAPH A DNA AMP PROBE: CPT

## 2019-09-01 PROCEDURE — 36415 COLL VENOUS BLD VENIPUNCTURE: CPT

## 2019-09-01 PROCEDURE — 73562 X-RAY EXAM OF KNEE 3: CPT

## 2019-09-01 PROCEDURE — 99238 HOSP IP/OBS DSCHRG MGMT 30/<: CPT

## 2019-09-01 PROCEDURE — 83605 ASSAY OF LACTIC ACID: CPT

## 2019-09-01 PROCEDURE — 87040 BLOOD CULTURE FOR BACTERIA: CPT

## 2019-09-01 PROCEDURE — 85027 COMPLETE CBC AUTOMATED: CPT

## 2019-09-01 PROCEDURE — 87641 MR-STAPH DNA AMP PROBE: CPT

## 2019-09-01 PROCEDURE — 99285 EMERGENCY DEPT VISIT HI MDM: CPT | Mod: 25

## 2019-09-01 PROCEDURE — 86140 C-REACTIVE PROTEIN: CPT

## 2019-09-01 PROCEDURE — 80048 BASIC METABOLIC PNL TOTAL CA: CPT

## 2019-09-01 PROCEDURE — 85652 RBC SED RATE AUTOMATED: CPT

## 2019-09-01 RX ORDER — IBUPROFEN 200 MG
1 TABLET ORAL
Qty: 20 | Refills: 0
Start: 2019-09-01 | End: 2019-09-05

## 2019-09-01 RX ADMIN — Medication 100 MILLIGRAM(S): at 06:29

## 2019-09-01 NOTE — PROGRESS NOTE ADULT - SUBJECTIVE AND OBJECTIVE BOX
ID progress note     Name: YAS PEREYRA  Age: 28y  Gender: Male  MRN: [id number]    Interval History-- events noted, doing much better , improved erythema right knee .Afebrile   Notes reviewed    Past Medical History--  Stenosis of carotid artery  Cellulitis of knee, left  Cellulitis of left hand  No pertinent past medical history  History of dissection of external carotid artery  No significant past surgical history      For details regarding the patient's social history, family history, and other miscellaneous elements, please refer the initial infectious diseases consultation and/or the admitting history and physical examination for this admission.    Allergies--  Allergies    No Known Allergies    Intolerances        Medications--  Antibiotics:  ceFAZolin   IVPB 1000 milliGRAM(s) IV Intermittent every 8 hours    Immunologic:    Other:  acetaminophen   Tablet .. PRN      Review of Systems--  A 10-point review of systems was obtained.     Pertinent positives and negatives--  Constitutional: No fevers. No Chills. No Rigors.   Cardiovascular: No chest pain. No palpitations.  Respiratory: No shortness of breath. No cough.  Gastrointestinal: No nausea or vomiting. No diarrhea or constipation.   Psychiatric: Pleasant. Appropriate affect.    Review of systems otherwise negative except as previously noted.    Physical Examination--  Vital Signs: T(F): 98 (09-01-19 @ 05:45), Max: 98.1 (08-31-19 @ 14:30)  HR: 82 (09-01-19 @ 05:45)  BP: 107/68 (09-01-19 @ 05:45)  RR: 17 (09-01-19 @ 05:45)  SpO2: 98% (09-01-19 @ 05:45)  Wt(kg): --  General: Nontoxic-appearing Male in no acute distress.  HEENT: AT/NC. PERRL. EOMI. Anicteric. Conjunctiva pink and moist. Oropharynx clear. Dentition fair.  Neck: Not rigid. No sense of mass.  Nodes: None palpable.  Lungs: Clear bilaterally without rales, wheezing or rhonchi  Heart: Regular rate and rhythm. No Murmur. No rub. No gallop. No palpable thrill.  Abdomen: Bowel sounds present and normoactive. Soft. Nondistended. Nontender. No sense of mass. No organomegaly.  Back: No spinal tenderness. No costovertebral angle tenderness.   Extremities: No cyanosis or clubbing. No edema. right knee- Skin intact, erythema receeded from previous demarcation small area of remaining erythema over anterior patella, no discrete area of fluctuance, non tender   Skin: Warm. Dry. Good turgor. No rash. No vasculitic stigmata.  Psychiatric: Appropriate affect and mood for situation.         Laboratory Studies--  CBC                        14.6   5.70  )-----------( 249      ( 31 Aug 2019 09:05 )             43.4       Chemistries  08-31    143  |  107  |  14  ----------------------------<  91  3.8   |  28  |  0.87    Ca    9.1      31 Aug 2019 09:05      MRSA/MSSA PCR (08.30.19 @ 22:18)    MRSA PCR Result.: NotDetec: .    Staph Aureus PCR Result: Detected      Culture Data    Culture - Blood (collected 30 Aug 2019 00:35)  Source: .Blood Blood-Venous  Preliminary Report (31 Aug 2019 01:01):    No growth to date.    Culture - Blood (collected 30 Aug 2019 00:35)  Source: .Blood Blood-Venous  Preliminary Report (31 Aug 2019 01:01):    No growth to date.    RADIOLOGY:   Xray Knee 3 Views, Right (08.30.19 @ 07:50) >    Findings/  impression:    No acute fracture or dislocation. Marked soft tissue swelling overlying   the anterior knee.      Assessment :     28 year old male with above medical hx admitted with cellulitis right knee with likely prepatellar bursitiis . Did not respond to Augmentin raising the possibility of MRSA . Clinically appears stable .  May be developing localised collection over patella , clinically much improved.     No sign to suggest local collection as per orthopedics   Plan :   - dc home on Po Keflex 500 mg qid x 7 days or Duricef 500 mg bid x 7 days   - warm compresses to right knee   - orthopedic follow up as out patient   - elevate right leg     Continue with present regime .  Appropriate use of antibiotics and adverse effects reviewed.    I have discussed the above plan of care with patient and family in detail. They expressed understanding of the treatment plan . Risks, benefits and alternatives discussed in detail. I have asked if they have any questions or concerns and appropriately addressed them to the best of my ability .      > 25 minutes spent in direct patient care reviewing  the notes, lab data/ imaging , discussion with multidisciplinary team. All questions were addressed and answered to the best of my capacity .    Thank you for allowing me to participate in the care of your patient .        Atul Hopkins MD  [phone]

## 2019-09-01 NOTE — DISCHARGE NOTE NURSING/CASE MANAGEMENT/SOCIAL WORK - PATIENT PORTAL LINK FT
You can access the FollowMyHealth Patient Portal offered by [facility] by registering at the following website: [email or web address]. By joining [employer]’s FollowMyHealth portal, you will also be able to view your health information using other applications (apps) compatible with our system.

## 2019-09-01 NOTE — PROGRESS NOTE ADULT - ASSESSMENT
A/P 28M with R knee cellulitis    -Erythema improved, no pain on ROM, no clinical suspicion for septic arthritis   -No areas of fluctuance requiring aspiration at this time  -Clinically improved  -No plan for acute orthopaedic surgical intervention  -Case discussed with ID Dr. Hopkins yesterday, plan for conversion to PO abx and DC home today as he has continued to improve  -Agree with above plan for DC today on PO abx, will discuss with attending and advise if plan changes

## 2019-09-01 NOTE — PROGRESS NOTE ADULT - SUBJECTIVE AND OBJECTIVE BOX
CHIEF COMPLAINT/INTERVAL HISTORY:  Pt. seen and evaluated for cellulitis of the RLE.  Pt. is in no distress.  Tolerating IV antibiotics.  Erythema and warmth of RLE continues to recede.      REVIEW OF SYSTEMS:  No fever, CP, SOB, or abdominal pain    Vital Signs Last 24 Hrs  T(C): 36.7 (01 Sep 2019 05:45), Max: 36.7 (31 Aug 2019 14:30)  T(F): 98 (01 Sep 2019 05:45), Max: 98.1 (31 Aug 2019 14:30)  HR: 82 (01 Sep 2019 05:45) (82 - 86)  BP: 107/68 (01 Sep 2019 05:45) (107/68 - 120/81)  BP(mean): --  RR: 17 (01 Sep 2019 05:45) (16 - 17)  SpO2: 98% (01 Sep 2019 05:45) (97% - 98%)    PHYSICAL EXAM:  GENERAL: NAD  HEENT: EOMI, hearing normal, conjunctiva and sclera clear  Chest: CTA bilaterally, no wheezing  CV: S1S2, RRR,   GI: soft, +BS, NT/ND  Musculoskeletal: decreasing erythema and edema of R. knee  Psychiatric: affect nL, mood nL  Skin: warm and dry    LABS:                        14.6   5.70  )-----------( 249      ( 31 Aug 2019 09:05 )             43.4     08-31    143  |  107  |  14  ----------------------------<  91  3.8   |  28  |  0.87    Ca    9.1      31 Aug 2019 09:05      Assessment and Plan:  -Cellulitis and pre-patellar bursitis:  Blood cx NGTD.  Will switch to oral antibiotic for discharge.  Tylenol PRN.  Orthopedic and ID f/u  -VTE ppx: SCD

## 2019-09-01 NOTE — PROGRESS NOTE ADULT - SUBJECTIVE AND OBJECTIVE BOX
Pt S/E at bedside, no acute events overnight, pain controlled, denies fever/chills/SOB/CP.     Vital Signs Last 24 Hrs  T(C): 36.7 (01 Sep 2019 05:45), Max: 36.7 (31 Aug 2019 14:30)  T(F): 98 (01 Sep 2019 05:45), Max: 98.1 (31 Aug 2019 14:30)  HR: 82 (01 Sep 2019 05:45) (82 - 86)  BP: 107/68 (01 Sep 2019 05:45) (107/68 - 120/81)  BP(mean): --  RR: 17 (01 Sep 2019 05:45) (16 - 17)  SpO2: 98% (01 Sep 2019 05:45) (97% - 98%)    Gen: NAD,    Left Lower Extremity:  Skin intact, erythema receeded from previous demarcation small area of remaining erythema over anterior patella, no discrete area of fluctuance  +EHL/FHL/TA/GS  SILT L3-S1  +DP/PT Pulses  Compartments soft  No calf TTP B/L

## 2019-09-04 LAB
CULTURE RESULTS: SIGNIFICANT CHANGE UP
CULTURE RESULTS: SIGNIFICANT CHANGE UP
SPECIMEN SOURCE: SIGNIFICANT CHANGE UP
SPECIMEN SOURCE: SIGNIFICANT CHANGE UP

## 2019-12-02 ENCOUNTER — TRANSCRIPTION ENCOUNTER (OUTPATIENT)
Age: 29
End: 2019-12-02

## 2019-12-03 ENCOUNTER — EMERGENCY (EMERGENCY)
Facility: HOSPITAL | Age: 29
LOS: 1 days | Discharge: HOME | End: 2019-12-03
Attending: EMERGENCY MEDICINE | Admitting: EMERGENCY MEDICINE
Payer: COMMERCIAL

## 2019-12-03 VITALS
SYSTOLIC BLOOD PRESSURE: 133 MMHG | HEIGHT: 74 IN | RESPIRATION RATE: 18 BRPM | WEIGHT: 195.11 LBS | OXYGEN SATURATION: 98 % | HEART RATE: 83 BPM | TEMPERATURE: 98 F | DIASTOLIC BLOOD PRESSURE: 82 MMHG

## 2019-12-03 VITALS
HEART RATE: 74 BPM | RESPIRATION RATE: 16 BRPM | OXYGEN SATURATION: 98 % | TEMPERATURE: 98 F | DIASTOLIC BLOOD PRESSURE: 78 MMHG | SYSTOLIC BLOOD PRESSURE: 132 MMHG

## 2019-12-03 DIAGNOSIS — Z86.79 PERSONAL HISTORY OF OTHER DISEASES OF THE CIRCULATORY SYSTEM: Chronic | ICD-10-CM

## 2019-12-03 PROCEDURE — 99285 EMERGENCY DEPT VISIT HI MDM: CPT | Mod: 25

## 2019-12-03 PROCEDURE — 87186 SC STD MICRODIL/AGAR DIL: CPT

## 2019-12-03 PROCEDURE — 73140 X-RAY EXAM OF FINGER(S): CPT | Mod: 26,LT

## 2019-12-03 PROCEDURE — 10120 INC&RMVL FB SUBQ TISS SMPL: CPT

## 2019-12-03 PROCEDURE — 14040 TIS TRNFR F/C/C/M/N/A/G/H/F: CPT

## 2019-12-03 PROCEDURE — 99283 EMERGENCY DEPT VISIT LOW MDM: CPT

## 2019-12-03 PROCEDURE — 10140 I&D HMTMA SEROMA/FLUID COLLJ: CPT

## 2019-12-03 PROCEDURE — 73140 X-RAY EXAM OF FINGER(S): CPT

## 2019-12-03 PROCEDURE — 87070 CULTURE OTHR SPECIMN AEROBIC: CPT

## 2019-12-03 PROCEDURE — 87205 SMEAR GRAM STAIN: CPT

## 2019-12-03 PROCEDURE — 11042 DBRDMT SUBQ TIS 1ST 20SQCM/<: CPT

## 2019-12-03 RX ADMIN — Medication 1 TABLET(S): at 21:43

## 2019-12-03 NOTE — ED PROVIDER NOTE - PATIENT PORTAL LINK FT
You can access the FollowMyHealth Patient Portal offered by [facility] by registering at the following website: [email or web address] By joining [employer]’s FollowMyHealth portal, you will also be able to view your health information using other applications (apps) compatible with our system.

## 2019-12-03 NOTE — ED PROVIDER NOTE - NS_ ATTENDINGSCRIBEDETAILS _ED_A_ED_FT
[place] Miguel MD - The scribe's documentation has been prepared under my direction and personally reviewed by me in its entirety. I confirm that the note above accurately reflects all work, treatment, procedures, and medical decision making performed by me.

## 2019-12-03 NOTE — ED ADULT NURSE NOTE - OBJECTIVE STATEMENT
recd pt  A/Ox3, pt c/o swelling to left 4th digit, pt got a piece of wood stuck in left ring finger, wood was removed and tdap shot was given previously. Pt states his finger than started to swell and a couple weeks ago pt saw Dr. Leonel flores. pt  denies fevers chills,  chest pain or sob. Respirations are even and unlabored, lungs cta, +bowel x4 quads, abdomen soft, nontender/nondistended, no guarding, rebound or rigidity noted, skin w/d/i.

## 2019-12-03 NOTE — ED PROVIDER NOTE - CARE PROVIDER_API CALL
Erik Sauceda)  Plastic Surgery  [address] [place] [address]  Phone: [phone]  Fax: [phone]  Follow Up Time: 1-3 Days

## 2019-12-03 NOTE — CONSULT NOTE ADULT - SUBJECTIVE AND OBJECTIVE BOX
YAS PEREYRA  [id number]  [facility]    29yMale, RHD, presents to the ER with left ring finger swelling for 2 weeks after mishandling wood.  Patient denies weakness or numbness in the hand.  Patient denies other injuries.    PMHx/PSHx:  No pertinent family history in first degree relatives  MEWS Score  Stenosis of carotid artery  Cellulitis of knee, left  Cellulitis of left hand  No pertinent past medical history  Foreign body of finger of left hand, initial encounter  History of dissection of external carotid artery  No significant past surgical history    No Known Allergies    T(C): 36.8 (12-03-19 @ 20:46), Max: 36.8 (12-03-19 @ 20:46)  HR: 83 (12-03-19 @ 20:46) (83 - 83)  BP: 133/82 (12-03-19 @ 20:46) (133/82 - 133/82)  RR: 18 (12-03-19 @ 20:46) (18 - 18)  SpO2: 98% (12-03-19 @ 20:46) (98% - 98%)  NAD  Left RF:  + Swelling, + erythema, + Fluctuance along volar aspect of middle phalanx with tenderness. +FPL/+OP/+FDS/+FDP/+Extension in DIP/PIP/MCP joints of all digits.  Cap refill <3s.  +UDN/+RDN in all digits.  Soft compartments.      Xray: See report        Procedure:  Finger digital block. 1.5cm incision, evacuation of purulent fluid. Washout of wound with betadine.  Removal of multiple foreign bodies.  Excisional debridement skin including subcutaneous layer.  Skin flaps widely undermined, advanced, repaired with 4.0 chromic.  Packing placed.  Antibotic dressing applied with splint.

## 2019-12-03 NOTE — ED PROVIDER NOTE - OBJECTIVE STATEMENT
30 y/o male with a PMHx of stenosis of carotid artery presents to the ED c/o finger swelling. 1 month ago pt got a piece of wood stuck in left ring finger. Pt had the wood removed and had a tdap shot at this time. Pt states his finger than started to swell and a couple weeks ago pt saw Dr. Leonel flores and had an MRI to r/o foreign body that is negative. Since then finger kept swelling. No fever. Set in by Dr. Castaneda for drainage of finger in ED.

## 2019-12-03 NOTE — ED PROVIDER NOTE - MUSCULOSKELETAL, MLM
+left fourth finger erythema and swelling over volar aspect of middle and distal phalanges, neurovascularly intact distally
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
## 2019-12-03 NOTE — CONSULT NOTE ADULT - ASSESSMENT
A/P: 29yy/o with left RF with collection s/p drainage, debridement and foreign body removal.  - LUE elevation  - Pain control  - Tetanus  - Abx, f/u wound culture  - Maintain splint  - F/U 2 days  - Patient educated on warning signs to prompt ER return    Thank You  Erik Sauceda MD  Plastic Surgery  [phone]

## 2019-12-04 PROBLEM — L03.114 CELLULITIS OF LEFT UPPER LIMB: Chronic | Status: ACTIVE | Noted: 2019-08-29

## 2019-12-04 PROBLEM — L03.116 CELLULITIS OF LEFT LOWER LIMB: Chronic | Status: ACTIVE | Noted: 2019-08-29

## 2019-12-04 PROBLEM — I65.29 OCCLUSION AND STENOSIS OF UNSPECIFIED CAROTID ARTERY: Chronic | Status: ACTIVE | Noted: 2019-08-29

## 2019-12-06 LAB
-  AMPICILLIN/SULBACTAM: SIGNIFICANT CHANGE UP
-  CEFAZOLIN: SIGNIFICANT CHANGE UP
-  CLINDAMYCIN: SIGNIFICANT CHANGE UP
-  ERYTHROMYCIN: SIGNIFICANT CHANGE UP
-  GENTAMICIN: SIGNIFICANT CHANGE UP
-  OXACILLIN: SIGNIFICANT CHANGE UP
-  PENICILLIN: SIGNIFICANT CHANGE UP
-  RIFAMPIN: SIGNIFICANT CHANGE UP
-  TETRACYCLINE: SIGNIFICANT CHANGE UP
-  TRIMETHOPRIM/SULFAMETHOXAZOLE: SIGNIFICANT CHANGE UP
-  VANCOMYCIN: SIGNIFICANT CHANGE UP
METHOD TYPE: SIGNIFICANT CHANGE UP

## 2019-12-09 LAB
CULTURE RESULTS: SIGNIFICANT CHANGE UP
ORGANISM # SPEC MICROSCOPIC CNT: SIGNIFICANT CHANGE UP
ORGANISM # SPEC MICROSCOPIC CNT: SIGNIFICANT CHANGE UP
SPECIMEN SOURCE: SIGNIFICANT CHANGE UP

## 2020-08-18 NOTE — H&P ADULT - NSHPREVIEWOFSYSTEMS_GEN_ALL_CORE
----- Message from Eliza Romero MD sent at 8/18/2020  9:15 AM CDT -----  BMP is improved. IS she being drawn today?  
Yes BMP done today.   
CONSTITUTIONAL: admits fever and chills, denies fatigue, weakness  HEENT: denies blurred vision, sore throat  SKIN: denies new lesions, rash  CARDIOVASCULAR: denies chest pain, chest pressure, palpitations  RESPIRATORY: denies shortness of breath, sputum production  GASTROINTESTINAL: denies nausea, vomiting, diarrhea, abdominal pain  GENITOURINARY: denies dysuria, discharge  NEUROLOGICAL: denies numbness, headache, focal weakness  MUSCULOSKELETAL: + admits new joint pain, muscle aches  HEMATOLOGIC: denies gross bleeding, bruising  LYMPHATICS: denies enlarged lymph nodes, admits to extremity swelling of Right leg

## 2020-12-05 NOTE — DISCHARGE NOTE PROVIDER - NSDCACTIVITY_GEN_ALL_CORE
RN Oskar asked for me to come to patient's bedside for expiration.    Wife and dtr Linda at bedside.  Patient with no evidence of breathing, no breath sounds appreciated, no heart sounds appreciated. Pupils not reactive to light.    Patient pronounced dead at 11:44 am.   No restrictions

## 2022-01-13 NOTE — ED ADULT TRIAGE NOTE - SPO2 (%)
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 therapy  Denies any excess bruising melena or hematochezia or any falls  Denies any chest pain concerning shortness of breath palpitations presyncope syncope TIA or claudication like symptoms    Pulse is regular    Echo today revealed normal LV systolic function wall motion without significant valvulopathy  The following portions of the patient's history were reviewed and updated as appropriate: allergies, current medications, past family history, past medical history, past social history, past surgical history and problem list   Review of Systems   Constitutional: Negative for chills, diaphoresis, malaise/fatigue and weight gain  HENT: Negative for nosebleeds and stridor  Eyes: Negative for double vision, vision loss in left eye, vision loss in right eye and visual disturbance  Cardiovascular: Negative for chest pain, claudication, cyanosis, dyspnea on exertion, irregular heartbeat, leg swelling, near-syncope, orthopnea, palpitations, paroxysmal nocturnal dyspnea and syncope  Respiratory: Negative for cough, shortness of breath, snoring and wheezing  Endocrine: Negative for polydipsia, polyphagia and polyuria  Hematologic/Lymphatic: Negative for bleeding problem  Does not bruise/bleed easily  Skin: Negative for flushing and rash  Musculoskeletal: Positive for arthritis and stiffness  Negative for falls and myalgias  Gastrointestinal: Negative for abdominal pain, heartburn, hematemesis, hematochezia, melena and nausea  Genitourinary: Negative for hematuria  Neurological: Negative for brief paralysis, dizziness, focal weakness, headaches, light-headedness, loss of balance and vertigo  Psychiatric/Behavioral: Negative for altered mental status and substance abuse  Allergic/Immunologic: Negative for hives  Objective:      /80   Pulse 76   Ht 5' 10" (1 778 m)   Wt (!) 162 kg (358 lb)   BMI 51 37 kg/m²   Physical Exam  Constitutional:       General: He is not in acute distress  Appearance: He is well-developed  He is not ill-appearing or diaphoretic  HENT:      Head: Normocephalic and atraumatic  Eyes:      General: No scleral icterus  Pupils: Pupils are equal, round, and reactive to light  Neck:      Thyroid: No thyromegaly  Vascular: No carotid bruit or JVD  Cardiovascular:      Rate and Rhythm: Normal rate and regular rhythm  Heart sounds: Normal heart sounds  No murmur heard  No friction rub  No gallop  Pulmonary:      Effort: Pulmonary effort is normal  No respiratory distress  Breath sounds: Normal breath sounds  No stridor  No wheezing or rales  Abdominal:      General: Bowel sounds are normal  There is no distension  Palpations: Abdomen is soft  There is no mass  Tenderness: There is no abdominal tenderness  Musculoskeletal:         General: No deformity  Normal range of motion  Cervical back: Normal range of motion and neck supple  Right lower leg: Edema (Mild bilaterally, both Ace wrap bilaterally ) present  Left lower leg: Edema present  Skin:     General: Skin is warm and dry  Coloration: Skin is not pale  Findings: No erythema  Neurological:      Mental Status: He is alert and oriented to person, place, and time        Coordination: Coordination normal    Psychiatric:         Mood and Affect: Mood normal          Behavior: Behavior normal          Lab Review:   Hospital Outpatient Visit on 01/13/2022   Component Date Value    AV area peak kiesha 01/13/2022 2 9     LA size 01/13/2022 4     Aortic valve mean veloci* 01/13/2022 10 20     LVPWd 01/13/2022 1 30     MV E' Tissue Velocity Se* 01/13/2022 10     IVSd 01/13/2022 8 30     LV DIASTOLIC VOLUME (MOD* 91/28/1090 84     LEFT VENTRICLE SYSTOLIC * 79/65/4794 22     Left ventricular stroke * 01/13/2022 62 00     LVIDd 01/13/2022 4 30     LVIDS 01/13/2022 2 50     FS 01/13/2022 42     Asc Ao 01/13/2022 4     Ao root 01/13/2022 3 70     LVOT mn grad 01/13/2022 2 0     AV area by cont VTI 01/13/2022 2 5     AV mean gradient 01/13/2022 5     AV LVOT peak gradient 01/13/2022 4     E wave deceleration time 01/13/2022 209     LVOT diameter 01/13/2022 2 4     LVOT peak jordin 01/13/2022 0 96     LVOT peak VTI 01/13/2022 19 53     Ao peak jordin retrograde 01/13/2022 1 5     Ao VTI 01/13/2022 35 2     LVOT stroke volume 01/13/2022 88 31     AV peak gradient 01/13/2022 9     MV Peak E Jordin 01/13/2022 117     MV Peak A Jordin 01/13/2022 1 05     LVOT SI 01/13/2022 31 80     LVOT area 01/13/2022 4 52     AV Velocity Ratio 01/13/2022 0 64     AV valve area 01/13/2022 2 51     ZLVIDS 01/13/2022 -26 70     LV EF 01/13/2022 60      Diagnostic Sleep Study    Result Date: 12/21/2021  Narrative: Diagnostic Report Patient Name: Maria Del Carmen Greene Patient Details: Male, 63 years  Patient #: [id number] YOB: 1962 Referring Physician: Dr Mehreen Guzman Height: 70 0 in Interpreting Physician: Dr Vi George Weight: 358 0 lbs Study Date: 12/20/2021 B  M I : 51 4 STUDY FORMAT: The patient was admitted to the sleep laboratory at the [place] [address]  and oriented to procedures and equipment  Data was obtained using the [employer] sleep monitoring system; Parameters monitored: EEG (frontal, central, occipital), EOG, EMG (chin and leg), ECG, body position, abdominal and thoracic respiratory effort, snoring, pulse oximetry, and airflow  The sleep study was scored following the rules established by the American Academy of Sleep Medicine (AASM)  Hypopnea: event lasting ? 10 seconds with a ?30% reduction in airflow amplitude and a ?4% decrease in SpO2  HISTORY: Nocturnal polysomnography was undertaken to evaluate this patient with suggestive symptoms and/or risk factors for sleep  disordered breathing or movement disorder  SLEEP: Patient slept for 291 5 minutes out of 455 0 minutes in bed representing a sleep efficiency of 64 1%  Sleep architecture showed a sleep latency of 10 0 minutes and a REM sleep latency of 56 0 minutes  There was 2 7% Stage N1 noted  There was 76 3% Stage N2 noted  There was 8 6% Stage N3 noted  There was 12 3% REM sleep noted  The patient had 28 arousals for an average of 5 8 arousals per hour of sleep  RESPIRATORY: There were a total of 62 respiratory events made up of 2 obstructive apneas, 0 mixed apneas, 4 central apneas, and 56 hypopneas resulting in an apnea/hypopnea index (AHI) of 12 8 events per hour of sleep  The AHI in the supine position was 12 8  The AHI during REM sleep was 33 3  Mild-to-moderate intensity snoring was noted  OXIMETRY: The baseline oxygen saturation with the patient awake was 94 3%  The mean oxygen saturation throughout the study was 92 0%  The amount of sleep time below 90% was 44 7 minutes  The lowest oxygen saturation was 69 0%  BODY POSITION: The patient slept 100 0% of the evening in the supine position, 0 0% on left side, 0 0% on right side, and 0 0%  in the prone position  ADDITIONAL DATA: EMG, EEG and ECG were unremarkable  In his post study questionnaire, patient over estimated sleep latency at 30-60 minutes and underestimated total sleep time at < 2 hours  Impression:  1  Moderate obstructive sleep apnea 2  Sleep-related hypoxia 3  Sleep maintenance insomnia Based on the results of this study, a follow-up study to titrate positive airway pressure is recommended  Clinical correlation is advised          Board Certified Sleep Physician     Echo complete w/ contrast if indicated    Result Date: 1/13/2022  Narrative: Elisha Hewitt: Left ventricular cavity size is normal  The left ventricular ejection fraction is 60%  Systolic function is normal  Wall motion is normal  Diastolic function is normal  Wall thickness is mildly increased  There is mild concentric hypertrophy    Left Atrium: The atrium is mildly dilated  Assessment:       1  Paroxysmal atrial fibrillation (HCC)     2  Obstructive sleep apnea syndrome     3  Primary hypertension     4  Chronic diastolic congestive heart failure ([facility] )     5  Hx pulmonary embolism     6  Dyslipidemia associated with type 2 diabetes mellitus ([facility] 75 )          Plan:       Levi Soto remains in sinus rhythm, fortunately has no signs or symptoms reminiscent of angina pectoris heart failure nor malignant dysrhythmia  Due to high CHADS2 stroke risk score he should stay on full anticoagulation from a cardiac perspective but also with his history of recurrent DVT and pulmonary embolus as well  He examines relatively euvolemic but for chronic lower extremity edema, on current diuretic therapy I recommend no changes here  I did strongly recommend he follow up with wound care  I am happy with his blood pressure control, his LDL is at goal on the proper statin, I suspect his triglycerides would improved dramatically with improved A1c control  We made no changes today, ordered no cardiac testing, Levi Soto and  were happy with his echocardiogram results today  I will see him back in about 6 months, asked to call me sooner with any concerning potential cardiac symptoms in the meantime  100

## 2022-02-26 NOTE — ED ADULT NURSE NOTE - READING LANGUAGE PREFERRED
Include Location In Plan?: Yes Hide Include Location In Plan Question?: No Detail Level: Zone Detail Level: Generalized English CONSTITUTIONAL: Well-developed; well-nourished; in no acute distress.   SKIN: warm, dry  HEAD: Normocephalic; atraumatic.  EYES: no conjunctival injection. PERRL.   ENT: No nasal discharge; airway clear.  CARD: S1, S2 normal; no murmurs, gallops, or rubs. Regular rate and rhythm.   RESP: No wheezes, rales or rhonchi.  EXT: No clubbing, cyanosis. Left third metacarpophalangeal joint with tenderness, redness, and swelling, decreased flexion at joint due to pain, normal sensation and full strength in b/l UE, 2+ radial pulses b/l  NEURO: Alert, oriented, grossly unremarkable  PSYCH: Cooperative, appropriate.

## 2023-06-01 NOTE — PATIENT PROFILE ADULT - PUBLIC BENEFITS

## 2024-01-12 ENCOUNTER — NON-APPOINTMENT (OUTPATIENT)
Age: 34
End: 2024-01-12

## 2024-05-23 NOTE — ED ADULT TRIAGE NOTE - BMI (KG/M2)
Patient : Joel Urias Age: 64 year old Sex: male   MRN: [id number] Encounter Date: 5/23/2024    History     Chief Complaint   Patient presents with    Chest Pain     In by [facility]       HPI    Joel Urias is a 64 year old presenting to the emergency department for chest pain        Past/Family/Social History     No Known Allergies    No current facility-administered medications for this encounter.     No current outpatient medications on file.       History reviewed. No pertinent past medical history.    No past surgical history on file.    No family history on file.            Review of Systems   Review of Symptoms     Review of Systems       Physical Exam   Physical Exam     ED Triage Vitals [05/22/24 1304]   ED Triage Vitals Group      Temp 97.5 °F (36.4 °C)      Heart Rate 94      Resp 16      BP (!) 158/97      SpO2 97 %      EtCO2 mmHg       Height 5' 7\" (1.702 m)      Weight 202 lb (91.6 kg)      Weight Scale Used Standing scale      BMI (Calculated) 31.64      IBW/kg (Calculated) 66.1       Physical Exam       Procedures   ED Procedures     Procedures       Lab Results   ED Lab     Results for orders placed or performed during the hospital encounter of 05/23/24   Comprehensive Metabolic Panel   Result Value Ref Range    Fasting Status      Sodium 134 (L) 135 - 145 mmol/L    Potassium 3.5 3.4 - 5.1 mmol/L    Chloride 103 97 - 110 mmol/L    Carbon Dioxide 23 21 - 32 mmol/L    Anion Gap 12 7 - 19 mmol/L    Glucose 156 (H) 70 - 99 mg/dL    BUN 11 6 - 20 mg/dL    Creatinine 0.72 0.67 - 1.17 mg/dL    Glomerular Filtration Rate >90 >=60    BUN/Cr 15 7 - 25    Calcium 9.6 8.4 - 10.2 mg/dL    Bilirubin, Total 1.1 (H) 0.2 - 1.0 mg/dL    GOT/AST 66 (H) <=37 Units/L    GPT/ALT 89 (H) <64 Units/L    Alkaline Phosphatase 109 45 - 117 Units/L    Albumin 3.9 3.6 - 5.1 g/dL    Protein, Total 8.7 (H) 6.4 - 8.2 g/dL    Globulin 4.8 (H) 2.0 - 4.0 g/dL    A/G Ratio 0.8 (L) 1.0 - 2.4   TROPONIN I, HIGH  SENSITIVITY   Result Value Ref Range    Troponin I, High Sensitivity 10 <77 ng/L   CBC with Automated Differential (performable only)   Result Value Ref Range    WBC 14.5 (H) 4.2 - 11.0 K/mcL    RBC 5.42 4.50 - 5.90 mil/mcL    HGB 17.4 (H) 13.0 - 17.0 g/dL    HCT 51.1 (H) 39.0 - 51.0 %    MCV 94.3 78.0 - 100.0 fl    MCH 32.1 26.0 - 34.0 pg    MCHC 34.1 32.0 - 36.5 g/dL    RDW-CV 13.2 11.0 - 15.0 %    RDW-SD 45.9 39.0 - 50.0 fL     140 - 450 K/mcL    NRBC 0 <=0 /100 WBC    Neutrophil, Percent 80 %    Lymphocytes, Percent 11 %    Mono, Percent 7 %    Eosinophils, Percent 0 %    Basophils, Percent 1 %    Immature Granulocytes 1 %    Absolute Neutrophils 11.6 (H) 1.8 - 7.7 K/mcL    Absolute Lymphocytes 1.6 1.0 - 4.0 K/mcL    Absolute Monocytes 1.1 (H) 0.3 - 0.9 K/mcL    Absolute Eosinophils  0.0 0.0 - 0.5 K/mcL    Absolute Basophils 0.1 0.0 - 0.3 K/mcL    Absolute Immature Granulocytes 0.1 0.0 - 0.2 K/mcL   Light Blue Top   Result Value Ref Range    Extra Tube Hold for Add Ons    Gold Top   Result Value Ref Range    Extra Tube Hold for Add Ons    NT proBNP   Result Value Ref Range    NT-proBNP 55 <=125 pg/mL          EKG     EKG Interpretation  Rate: 93  Rhythm: normal sinus rhythm   Abnormality: no    EKG independently interpreted by the emergency department physician    Radiology Results   ED Radiology Results     Imaging Results              CT HEAD WO CONTRAST (Final result)  Result time 05/22/24 14:18:49      Final result                   Impression:      No acute transcortical infarct, hemorrhage or intracranial mass effect      Electronically Signed by: RICHARD JUNE MD   Signed on: 5/22/2024 2:18 PM   Workstation ID: [id number]               Narrative:    EXAMINATION: Computed tomography (CT) of the head without contrast    HISTORY: 64 years Male Syncopal episode in the morning    TECHNIQUE: CT of the head was performed without contrast according to  standard protocol.    COMPARISON:  None    FINDINGS:     No acute intra- or extra-axial fluid collections are identified. There is  mild cerebral volume loss, without a definite lobar predilection, with  compensatory enlargement of the ventricles and sulci. The basilar cisterns  are patent. No mass effect or midline shift is seen. The gray-white matter  differentiation is normal. Periventricular white matter hypoattenuation is  favored to represent sequelae of chronic small vessel ischemic disease.  There is vascular calcification of the carotid siphons. The visualized  portions of the orbits, paranasal sinuses, and mastoids appear normal. No  acute fracture is identified.                                       XR CHEST PA AND LATERAL 2 VIEWS (Final result)  Result time 05/22/24 14:15:52   Procedure changed from XR CHEST PA OR AP 1 VIEW     Final result                   Impression:    1.   No acute cardiopulmonary process  2.   No focal opacity .   3.   Mild hyperinflation is present which can be seen in the setting of  obstructive lung disease          Electronically Signed by: FAUSTINO ANTON MD   Signed on: 5/22/2024 2:15 PM   Workstation ID: [id number]               Narrative:    EXAM:  XR CHEST PA AND LATERAL 2 VIEWS   PROVIDED CLINICAL INFORMATION:  Chest pain 5' 7\" ft 202 lb  ADDITIONAL HISTORY:  TECHNIQUE:  PA and lateral   COMPARISON:  None available    FINDINGS:  Lungs: No focal lobar opacity, nodule or congestion. Mild hyperinflation is  present which can be seen in the setting of obstructive lung disease. Mild  interstitial prominence may represent chronic lung disease  Tracheal air column: Midline  There is no identifiable pneumothorax/pneumomediastinum    Heart:      The heart is normal in size.  Mediastinum/hilum:    The mediastinum is within normal limits. There is no  hilar or contour abnormality to suggest adenopathy or hilar mass or  enlarged pulmonary arteries.    Bones/soft Tissues:   Multilevel mild degenerative spondylosis of  the  visualized thoracolumbar spine.  No concerning bony abnormality. .  No concerning soft tissue abnormality  visualized  Upper Abdomen:  Visualize gas pattern is nonobstructive.  No focal mass.   No free intraperitoneal air.                                         ED Medications   ED Medications     ED Medication Orders (From admission, onward)      None               ED Course     Vitals:    05/22/24 1304 05/22/24 1644 05/22/24 2014 05/23/24 0042   BP: (!) 158/97 (!) 175/103 (!) 178/96 (!) 166/108   BP Location:  RUE - Right upper extremity  LUE - Left upper extremity   Patient Position:  Sitting/High-Mon's  Sitting/High-Mon's   Pulse: 94 83 84 75   Resp: 16 18 16 16   Temp: 97.5 °F (36.4 °C) 98.3 °F (36.8 °C)     TempSrc: Oral Oral     SpO2: 97% 98% 97% 99%   Weight: 91.6 kg (202 lb)      Height: 5' 7\" (1.702 m)               Cardiac Monitor Review: 3:22 AM  I have independently reviewed the patients cardiac monitor. The patient's rhythm shows normal sinus rhythm  with rate of 80 bpm.          Consults                        Medical Decision Making  Teaching-Supervisory Addendum-Brief   I participated in the following activities of this patients care: the medical history, the physical exam, medical decision making, the procedure.     I personally performed: supervision of the patient's care, the medical history, the physical exam, the medical decision making.     The case was discussed with: the resident    Procedures: I directly supervised any procedure(s) noted by resident    Evaluation and management service: I agree with the evaluation and management decisions made in this patient's care.                                               Patient is a 64 year old with complaint of chest pain.  My differential diagnosis includes but is not limited to ACS vs aortic dissection vs COPD vs GERD vs musculoskeletal. The patient will require admission.     Does the Patient have sepsis: NO     Critical Care        No Critical Care        Disposition       Clinical Impression and Diagnosis  4:05 AM 05/23/24     Diagnosis:   1. Chest pain, unspecified type    2. COPD exacerbation  (CMD)                   There is no disposition no dispo time  There is no comment                   Thien Iyer MD  05/23/24 6014     25.1
